# Patient Record
(demographics unavailable — no encounter records)

---

## 2021-09-02 NOTE — NUR
BIBS FOR C/O VAGINAL BLEEDING X 3 WEEKS, WORSENING IN PAST 3 DAYS. DENIES PAIN. 
ABDOMEN SOFT AND NON-DISTENDED. WILL CONTINUE TO MONITOR THE PATIENT.

## 2021-09-08 NOTE — NUR
TO ER BED 16, C/O C/O VAGINAL BLEEDING SINCE 9/1. WAS HERE ON 9/2. "NOT REALLY 
BLEEDING BUT MORE OF A BROWN STAIN ", A&OX4, BREATHING EVEN AND UNLABORED